# Patient Record
Sex: MALE | Race: WHITE | NOT HISPANIC OR LATINO | Employment: OTHER | URBAN - METROPOLITAN AREA
[De-identification: names, ages, dates, MRNs, and addresses within clinical notes are randomized per-mention and may not be internally consistent; named-entity substitution may affect disease eponyms.]

---

## 2023-06-06 ENCOUNTER — OFFICE VISIT (OUTPATIENT)
Dept: PRIMARY CARE | Facility: CLINIC | Age: 53
End: 2023-06-06

## 2023-06-06 ENCOUNTER — TELEPHONE (OUTPATIENT)
Dept: PRIMARY CARE | Facility: CLINIC | Age: 53
End: 2023-06-06

## 2023-06-06 VITALS
OXYGEN SATURATION: 94 % | DIASTOLIC BLOOD PRESSURE: 62 MMHG | HEART RATE: 67 BPM | WEIGHT: 199 LBS | TEMPERATURE: 98 F | SYSTOLIC BLOOD PRESSURE: 112 MMHG

## 2023-06-06 DIAGNOSIS — M1A.9XX0 CHRONIC GOUT, UNSPECIFIED CAUSE, UNSPECIFIED SITE: Primary | ICD-10-CM

## 2023-06-06 DIAGNOSIS — M1A.9XX0 CHRONIC GOUT, UNSPECIFIED CAUSE, UNSPECIFIED SITE: ICD-10-CM

## 2023-06-06 PROBLEM — M10.9 GOUT: Status: ACTIVE | Noted: 2021-10-29

## 2023-06-06 PROBLEM — E78.5 HYPERLIPIDEMIA: Status: ACTIVE | Noted: 2022-08-18

## 2023-06-06 PROBLEM — J45.990 EXERCISE-INDUCED ASTHMA (HHS-HCC): Status: ACTIVE | Noted: 2021-10-29

## 2023-06-06 PROCEDURE — 99213 OFFICE O/P EST LOW 20 MIN: CPT | Performed by: FAMILY MEDICINE

## 2023-06-06 PROCEDURE — 1036F TOBACCO NON-USER: CPT | Performed by: FAMILY MEDICINE

## 2023-06-06 RX ORDER — PREDNISONE 20 MG/1
40 TABLET ORAL DAILY
Qty: 14 TABLET | Refills: 0 | Status: SHIPPED | OUTPATIENT
Start: 2023-06-06 | End: 2023-06-06 | Stop reason: SDUPTHER

## 2023-06-06 RX ORDER — ALLOPURINOL 300 MG/1
300 TABLET ORAL DAILY
COMMUNITY
End: 2023-06-06 | Stop reason: SDUPTHER

## 2023-06-06 RX ORDER — COLCHICINE 0.6 MG/1
0.6 TABLET ORAL DAILY
Qty: 30 TABLET | Refills: 5 | Status: SHIPPED | OUTPATIENT
Start: 2023-06-06 | End: 2023-11-14 | Stop reason: SDUPTHER

## 2023-06-06 RX ORDER — PREDNISONE 20 MG/1
40 TABLET ORAL DAILY
Qty: 14 TABLET | Refills: 0 | Status: SHIPPED | OUTPATIENT
Start: 2023-06-06 | End: 2023-06-13

## 2023-06-06 RX ORDER — ALBUTEROL SULFATE 90 UG/1
2 AEROSOL, METERED RESPIRATORY (INHALATION) EVERY 4 HOURS PRN
COMMUNITY
Start: 2021-10-29

## 2023-06-06 RX ORDER — ALLOPURINOL 300 MG/1
300 TABLET ORAL DAILY
Qty: 90 TABLET | Refills: 1 | Status: SHIPPED | OUTPATIENT
Start: 2023-06-06 | End: 2023-11-14 | Stop reason: SDUPTHER

## 2023-06-06 RX ORDER — COLCHICINE 0.6 MG/1
0.6 TABLET ORAL
COMMUNITY
Start: 2022-08-18

## 2023-06-06 RX ORDER — COLCHICINE 0.6 MG/1
0.6 TABLET ORAL DAILY
Qty: 30 TABLET | Refills: 5 | Status: SHIPPED | OUTPATIENT
Start: 2023-06-06 | End: 2023-06-06 | Stop reason: SDUPTHER

## 2023-06-06 NOTE — PROGRESS NOTES
Subjective   Patient ID: Brigido Hill is a 52 y.o. male who presents for Gout (Recheck- pt states having gout pain daily x2 months. ).  HPI patient has been having pain in his right foot for 2 months now.  He has been taking his allopurinol.  No longer taking the colchicine because he ran out.  He does not drink very much alcohol maybe 2 drinks a week.  Has been eating some cheese but not a lot.    Current Outpatient Medications on File Prior to Visit   Medication Sig Dispense Refill    allopurinol (Zyloprim) 300 mg tablet Take 1 tablet (300 mg) by mouth once daily.      albuterol 90 mcg/actuation inhaler Inhale 2 puffs every 4 hours if needed.      colchicine 0.6 mg tablet Take 1 tablet (0.6 mg) by mouth.       No current facility-administered medications on file prior to visit.        Vitals:    06/06/23 1034   BP: 112/62   Pulse: 67   Temp: 36.7 °C (98 °F)   SpO2: 94%       Review of Systems   All other systems reviewed and are negative.      Objective     Physical Exam  Constitutional:       Appearance: Normal appearance. He is well-developed.   HENT:      Head: Atraumatic.   Cardiovascular:      Heart sounds: No murmur heard.  Abdominal:      General: Bowel sounds are normal.      Palpations: Abdomen is soft.   Skin:     General: Skin is warm.   Neurological:      General: No focal deficit present.      Mental Status: He is alert.   Psychiatric:         Mood and Affect: Mood normal.         No visits with results within 2 Month(s) from this visit.   Latest known visit with results is:   Legacy Encounter on 08/11/2022   Component Date Value Ref Range Status    Glucose 08/11/2022 91  74 - 99 mg/dL Final    Sodium 08/11/2022 141  136 - 145 mmol/L Final    Potassium 08/11/2022 4.2  3.5 - 5.3 mmol/L Final    Chloride 08/11/2022 105  98 - 107 mmol/L Final    Bicarbonate 08/11/2022 25  21 - 32 mmol/L Final    Anion Gap 08/11/2022 15  10 - 20 mmol/L Final    Urea Nitrogen 08/11/2022 13  6 - 23 mg/dL Final     Creatinine 08/11/2022 1.02  0.50 - 1.30 mg/dL Final    GFR MALE 08/11/2022 89  >90 mL/min/1.73m2 Final    Comment:  CALCULATIONS OF ESTIMATED GFR ARE PERFORMED   USING THE 2021 CKD-EPI STUDY REFIT EQUATION   WITHOUT THE RACE VARIABLE FOR THE IDMS-TRACEABLE   CREATININE METHODS.    https://jasn.asnjournals.org/content/early/2021/09/22/ASN.8392339940      Calcium 08/11/2022 9.3  8.6 - 10.6 mg/dL Final    Albumin 08/11/2022 4.2  3.4 - 5.0 g/dL Final    Alkaline Phosphatase 08/11/2022 51  33 - 120 U/L Final    Total Protein 08/11/2022 6.6  6.4 - 8.2 g/dL Final    AST 08/11/2022 16  9 - 39 U/L Final    Total Bilirubin 08/11/2022 1.8 (H)  0.0 - 1.2 mg/dL Final    ALT (SGPT) 08/11/2022 21  10 - 52 U/L Final    Comment:  Patients treated with Sulfasalazine may generate    falsely decreased results for ALT.      Uric Acid 08/11/2022 8.7 (H)  4.0 - 7.5 mg/dL Final    Comment:  Venipuncture immediately after or during the    administration of Metamizole may lead to falsely   low results. Testing should be performed immediately   prior to Metamizole dosing.      WBC 08/11/2022 5.2  4.4 - 11.3 x10E9/L Final    nRBC 08/11/2022 0.0  0.0 - 0.0 /100 WBC Final    RBC 08/11/2022 4.96  4.50 - 5.90 x10E12/L Final    Hemoglobin 08/11/2022 15.7  13.5 - 17.5 g/dL Final    Hematocrit 08/11/2022 48.8  41.0 - 52.0 % Final    MCV 08/11/2022 98  80 - 100 fL Final    MCHC 08/11/2022 32.2  32.0 - 36.0 g/dL Final    Platelets 08/11/2022 258  150 - 450 x10E9/L Final    RDW 08/11/2022 13.0  11.5 - 14.5 % Final    Neutrophils % 08/11/2022 58.7  40.0 - 80.0 % Final    Immature Granulocytes %, Automated 08/11/2022 0.2  0.0 - 0.9 % Final    Comment:  Immature Granulocyte Count (IG) includes promyelocytes,    myelocytes and metamyelocytes but does not include bands.   Percent differential counts (%) should be interpreted in the   context of the absolute cell counts (cells/L).      Lymphocytes % 08/11/2022 29.8  13.0 - 44.0 % Final    Monocytes %  08/11/2022 7.2  2.0 - 10.0 % Final    Eosinophils % 08/11/2022 3.5  0.0 - 6.0 % Final    Basophils % 08/11/2022 0.6  0.0 - 2.0 % Final    Neutrophils Absolute 08/11/2022 3.03  1.20 - 7.70 x10E9/L Final    Lymphocytes Absolute 08/11/2022 1.54  1.20 - 4.80 x10E9/L Final    Monocytes Absolute 08/11/2022 0.37  0.10 - 1.00 x10E9/L Final    Eosinophils Absolute 08/11/2022 0.18  0.00 - 0.70 x10E9/L Final    Basophils Absolute 08/11/2022 0.03  0.00 - 0.10 x10E9/L Final    Cholesterol 08/11/2022 257 (H)  0 - 199 mg/dL Final    Comment: .      AGE      DESIRABLE   BORDERLINE HIGH   HIGH     0-19 Y     0 - 169       170 - 199     >/= 200    20-24 Y     0 - 189       190 - 224     >/= 225         >24 Y     0 - 199       200 - 239     >/= 240   **All ranges are based on fasting samples. Specific   therapeutic targets will vary based on patient-specific   cardiac risk.  .   Pediatric guidelines reference:Pediatrics 2011, 128(S5).   Adult guidelines reference: NCEP ATPIII Guidelines,     WEI 2001, 258:2486-97  .   Venipuncture immediately after or during the    administration of Metamizole may lead to falsely   low results. Testing should be performed immediately   prior to Metamizole dosing.      HDL 08/11/2022 37.2 (A)  mg/dL Final    Comment: .      AGE      VERY LOW   LOW     NORMAL    HIGH       0-19 Y       < 35   < 40     40-45     ----    20-24 Y       ----   < 40       >45     ----      >24 Y       ----   < 40     40-60      >60  .      Cholesterol/HDL Ratio 08/11/2022 6.9 (A)   Final    Comment: REF VALUES  DESIRABLE  < 3.4  HIGH RISK  > 5.0      LDL 08/11/2022 171 (H)  0 - 99 mg/dL Final    Comment: .                           NEAR      BORD      AGE      DESIRABLE  OPTIMAL    HIGH     HIGH     VERY HIGH     0-19 Y     0 - 109     ---    110-129   >/= 130     ----    20-24 Y     0 - 119     ---    120-159   >/= 160     ----      >24 Y     0 -  99   100-129  130-159   160-189     >/=190  .      [facility] 08/11/2022 49 (H)  0  - 40 mg/dL Final    Triglycerides 08/11/2022 245 (H)  0 - 149 mg/dL Final    Comment: .      AGE      DESIRABLE   BORDERLINE HIGH   HIGH     VERY HIGH   0 D-90 D    19 - 174         ----         ----        ----  91 D- 9 Y     0 -  74        75 -  99     >/= 100      ----    10-19 Y     0 -  89        90 - 129     >/= 130      ----    20-24 Y     0 - 114       115 - 149     >/= 150      ----         >24 Y     0 - 149       150 - 199    200- 499    >/= 500  .   Venipuncture immediately after or during the    administration of Metamizole may lead to falsely   low results. Testing should be performed immediately   prior to Metamizole dosing.      Non HDL Cholesterol 08/11/2022 220  mg/dL Final    Comment:     AGE      DESIRABLE   BORDERLINE HIGH   HIGH     VERY HIGH     0-19 Y     0 - 119       120 - 144     >/= 145    >/= 160    20-24 Y     0 - 149       150 - 189     >/= 190      ----         >24 Y    30 MG/DL ABOVE LDL CHOLESTEROL GOAL  .      PSA 08/11/2022 0.71  0.00 - 4.00 ng/mL Final    Comment: The FDA requires that the method used for PSA assay be   reported to the physician. Values obtained with different   assay methods must not be used interchangeably. This test   was performed at [facility] using the Siemens  GlycoVaxyn PSA method, which is a sandwich immunoassay using   chemiluminescence for quantitation. The assay is approved  for measurement of prostate-specific antigen (PSA) in   serum and may be used in conjunction with a digital rectal  examination in men 50 years and older as an aid in   detection of prostate cancer.   5-Alpha-reductase inhibitors (e.g. Proscar, Finasteride,   Avodart, Dutasteride and Shae) for the treatment of BPH   have been shown to lower PSA levels by an average of 50%   after 6 months of treatment.         Assessment/Plan   Problem List Items Addressed This Visit    None  Visit Diagnoses       Chronic gout, unspecified cause, unspecified site    -  Primary     Relevant Medications    predniSONE (Deltasone) 20 mg tablet    colchicine 0.6 mg tablet    Other Relevant Orders    Uric acid    Comprehensive Metabolic Panel        Giving prednisone burst and checking uric acid levels.  Will give daily colchicine as needed.  And may adjust dosage of allopurinol if needed

## 2023-06-16 ENCOUNTER — LAB (OUTPATIENT)
Dept: LAB | Facility: LAB | Age: 53
End: 2023-06-16

## 2023-06-16 ENCOUNTER — TELEPHONE (OUTPATIENT)
Dept: PRIMARY CARE | Facility: CLINIC | Age: 53
End: 2023-06-16

## 2023-06-16 DIAGNOSIS — M1A.9XX0 CHRONIC GOUT, UNSPECIFIED CAUSE, UNSPECIFIED SITE: ICD-10-CM

## 2023-06-16 LAB
ALANINE AMINOTRANSFERASE (SGPT) (U/L) IN SER/PLAS: 35 U/L (ref 10–52)
ALBUMIN (G/DL) IN SER/PLAS: 4.2 G/DL (ref 3.4–5)
ALKALINE PHOSPHATASE (U/L) IN SER/PLAS: 58 U/L (ref 33–120)
ANION GAP IN SER/PLAS: 13 MMOL/L (ref 10–20)
ASPARTATE AMINOTRANSFERASE (SGOT) (U/L) IN SER/PLAS: 23 U/L (ref 9–39)
BILIRUBIN TOTAL (MG/DL) IN SER/PLAS: 1.3 MG/DL (ref 0–1.2)
CALCIUM (MG/DL) IN SER/PLAS: 9.8 MG/DL (ref 8.6–10.6)
CARBON DIOXIDE, TOTAL (MMOL/L) IN SER/PLAS: 31 MMOL/L (ref 21–32)
CHLORIDE (MMOL/L) IN SER/PLAS: 103 MMOL/L (ref 98–107)
CREATININE (MG/DL) IN SER/PLAS: 0.93 MG/DL (ref 0.5–1.3)
GFR MALE: >90 ML/MIN/1.73M2
GLUCOSE (MG/DL) IN SER/PLAS: 90 MG/DL (ref 74–99)
POTASSIUM (MMOL/L) IN SER/PLAS: 4.8 MMOL/L (ref 3.5–5.3)
PROTEIN TOTAL: 6.8 G/DL (ref 6.4–8.2)
SODIUM (MMOL/L) IN SER/PLAS: 142 MMOL/L (ref 136–145)
URATE (MG/DL) IN SER/PLAS: 5.7 MG/DL (ref 4–7.5)
UREA NITROGEN (MG/DL) IN SER/PLAS: 20 MG/DL (ref 6–23)

## 2023-06-16 PROCEDURE — 36415 COLL VENOUS BLD VENIPUNCTURE: CPT

## 2023-06-16 PROCEDURE — 84550 ASSAY OF BLOOD/URIC ACID: CPT

## 2023-06-16 PROCEDURE — 80053 COMPREHEN METABOLIC PANEL: CPT

## 2023-06-16 NOTE — RESULT ENCOUNTER NOTE
Patient's uric acid levels are excellent.  Are his feet still hurting with colchicine?  It may be that his foot pain is not necessarily from gout.

## 2023-06-16 NOTE — TELEPHONE ENCOUNTER
----- Message from Danial Fraser MD sent at 6/16/2023  2:36 PM EDT -----  Patient's uric acid levels are excellent.  Are his feet still hurting with colchicine?  It may be that his foot pain is not necessarily from gout.

## 2023-06-16 NOTE — TELEPHONE ENCOUNTER
I informed pt of res, he did say that his feet are doing better he is going to continue with the Gilmer/elbert

## 2023-11-14 ENCOUNTER — OFFICE VISIT (OUTPATIENT)
Dept: PRIMARY CARE | Facility: CLINIC | Age: 53
End: 2023-11-14

## 2023-11-14 VITALS
DIASTOLIC BLOOD PRESSURE: 64 MMHG | HEART RATE: 64 BPM | SYSTOLIC BLOOD PRESSURE: 116 MMHG | WEIGHT: 204 LBS | OXYGEN SATURATION: 93 % | TEMPERATURE: 97.1 F

## 2023-11-14 DIAGNOSIS — M1A.9XX0 CHRONIC GOUT, UNSPECIFIED CAUSE, UNSPECIFIED SITE: ICD-10-CM

## 2023-11-14 DIAGNOSIS — Z12.11 ENCOUNTER FOR SCREENING FOR MALIGNANT NEOPLASM OF COLON: ICD-10-CM

## 2023-11-14 DIAGNOSIS — M79.674 TOE PAIN, RIGHT: Primary | ICD-10-CM

## 2023-11-14 DIAGNOSIS — F41.9 ANXIETY: ICD-10-CM

## 2023-11-14 DIAGNOSIS — F33.42 RECURRENT MAJOR DEPRESSIVE DISORDER, IN FULL REMISSION (CMS-HCC): ICD-10-CM

## 2023-11-14 PROCEDURE — 1036F TOBACCO NON-USER: CPT | Performed by: FAMILY MEDICINE

## 2023-11-14 PROCEDURE — 99213 OFFICE O/P EST LOW 20 MIN: CPT | Performed by: FAMILY MEDICINE

## 2023-11-14 RX ORDER — ALLOPURINOL 300 MG/1
300 TABLET ORAL DAILY
Qty: 90 TABLET | Refills: 3 | Status: SHIPPED | OUTPATIENT
Start: 2023-11-14 | End: 2023-11-15 | Stop reason: SDUPTHER

## 2023-11-14 RX ORDER — DULOXETIN HYDROCHLORIDE 30 MG/1
30 CAPSULE, DELAYED RELEASE ORAL DAILY
Qty: 90 CAPSULE | Refills: 1 | Status: SHIPPED | OUTPATIENT
Start: 2023-11-14 | End: 2023-12-19 | Stop reason: ALTCHOICE

## 2023-11-14 RX ORDER — DULOXETIN HYDROCHLORIDE 60 MG/1
60 CAPSULE, DELAYED RELEASE ORAL DAILY
Qty: 90 CAPSULE | Refills: 1 | Status: SHIPPED | OUTPATIENT
Start: 2023-11-14 | End: 2023-11-14 | Stop reason: ALTCHOICE

## 2023-11-14 RX ORDER — DULOXETIN HYDROCHLORIDE 30 MG/1
30 CAPSULE, DELAYED RELEASE ORAL 2 TIMES DAILY
Qty: 90 CAPSULE | Refills: 1 | Status: SHIPPED | OUTPATIENT
Start: 2023-11-14 | End: 2023-11-14 | Stop reason: SDUPTHER

## 2023-11-14 NOTE — PROGRESS NOTES
Subjective   Patient ID: Brigido Hill is a 53 y.o. male who presents for Gout (Recheck- R great toe always pain ).  HPI Gout: has still been having some pain in his R toe.  No flares, just pain especially after moving.    Depression/anxiety: Has been feeling depressed and anxious for a number of years.  Has been working with a counselor who recently encouraged him to talk to his doctor about getting treated.  Often times feels anxious about things more than they need to be.  Worries about things a lot.    Pain in head: Patient has been dealing with some TMJ discomfort in his left jaw.  He also says that for years now whenever he coughs or sneezes he has a sharp pain on his side of his left temple.  It only last couple seconds but it hurts a lot.  He oftentimes has to have a hand pressing on his left temple when he sneezes so it hurts less.  Denies any nausea vomiting or dizziness      Current Outpatient Medications on File Prior to Visit   Medication Sig Dispense Refill    albuterol 90 mcg/actuation inhaler Inhale 2 puffs every 4 hours if needed.      colchicine 0.6 mg tablet Take 1 tablet (0.6 mg) by mouth.      [DISCONTINUED] allopurinol (Zyloprim) 300 mg tablet Take 1 tablet (300 mg) by mouth once daily. 90 tablet 1    [DISCONTINUED] colchicine 0.6 mg tablet Take 1 tablet (0.6 mg) by mouth once daily. (Patient not taking: Reported on 11/14/2023) 30 tablet 5     No current facility-administered medications on file prior to visit.        Vitals:    11/14/23 1036   BP: 116/64   Pulse: 64   Temp: 36.2 °C (97.1 °F)   SpO2: 93%       Review of Systems   All other systems reviewed and are negative.      Objective     Physical Exam  Constitutional:       Appearance: Normal appearance. He is well-developed.   HENT:      Head: Atraumatic.   Cardiovascular:      Heart sounds: No murmur heard.  Abdominal:      General: Bowel sounds are normal.      Palpations: Abdomen is soft.   Skin:     General: Skin is warm.    Neurological:      General: No focal deficit present.      Mental Status: He is alert.   Psychiatric:         Mood and Affect: Mood normal.         No visits with results within 2 Month(s) from this visit.   Latest known visit with results is:   Lab on 06/16/2023   Component Date Value Ref Range Status    Uric Acid 06/16/2023 5.7  4.0 - 7.5 mg/dL Final     Venipuncture immediately after or during the    administration of Metamizole may lead to falsely   low results. Testing should be performed immediately   prior to Metamizole dosing.    Glucose 06/16/2023 90  74 - 99 mg/dL Final    Sodium 06/16/2023 142  136 - 145 mmol/L Final    Potassium 06/16/2023 4.8  3.5 - 5.3 mmol/L Final    Chloride 06/16/2023 103  98 - 107 mmol/L Final    Bicarbonate 06/16/2023 31  21 - 32 mmol/L Final    Anion Gap 06/16/2023 13  10 - 20 mmol/L Final    Urea Nitrogen 06/16/2023 20  6 - 23 mg/dL Final    Creatinine 06/16/2023 0.93  0.50 - 1.30 mg/dL Final    GFR MALE 06/16/2023 >90  >90 mL/min/1.73m2 Final     CALCULATIONS OF ESTIMATED GFR ARE PERFORMED   USING THE 2021 CKD-EPI STUDY REFIT EQUATION   WITHOUT THE RACE VARIABLE FOR THE IDMS-TRACEABLE   CREATININE METHODS.    https://jasn.asnjournals.org/content/early/2021/09/22/ASN.4668105105    Calcium 06/16/2023 9.8  8.6 - 10.6 mg/dL Final    Albumin 06/16/2023 4.2  3.4 - 5.0 g/dL Final    Alkaline Phosphatase 06/16/2023 58  33 - 120 U/L Final    Total Protein 06/16/2023 6.8  6.4 - 8.2 g/dL Final    AST 06/16/2023 23  9 - 39 U/L Final    Total Bilirubin 06/16/2023 1.3 (H)  0.0 - 1.2 mg/dL Final    ALT (SGPT) 06/16/2023 35  10 - 52 U/L Final     Patients treated with Sulfasalazine may generate    falsely decreased results for ALT.       Assessment/Plan   Problem List Items Addressed This Visit    None  Visit Diagnoses       Toe pain, right    -  Primary    Relevant Orders    XR toe right 2+ views    Chronic gout, unspecified cause, unspecified site        Relevant Medications    allopurinol  (Zyloprim) 300 mg tablet    Recurrent major depressive disorder, in full remission (CMS/HCC)        Relevant Medications    DULoxetine (Cymbalta) 30 mg DR capsule    Anxiety        Encounter for screening for malignant neoplasm of colon        Relevant Orders    Colonoscopy Screening; Average Risk Patient          Started patient on Cymbalta.  We will increase his dosage if not controlled in 2 months.  Try high-dose ibuprofen for 1 week for his foot.  If not getting better we will get an x-ray of his foot and refer him to podiatry.  Follow-up in 6 months

## 2023-11-15 DIAGNOSIS — M1A.9XX0 CHRONIC GOUT, UNSPECIFIED CAUSE, UNSPECIFIED SITE: ICD-10-CM

## 2023-11-16 RX ORDER — ALLOPURINOL 300 MG/1
300 TABLET ORAL DAILY
Qty: 90 TABLET | Refills: 3 | Status: SHIPPED | OUTPATIENT
Start: 2023-11-16 | End: 2023-11-22 | Stop reason: SDUPTHER

## 2023-11-18 DIAGNOSIS — M1A.9XX0 CHRONIC GOUT, UNSPECIFIED CAUSE, UNSPECIFIED SITE: ICD-10-CM

## 2023-11-20 RX ORDER — ALLOPURINOL 300 MG/1
300 TABLET ORAL DAILY
Qty: 90 TABLET | Refills: 0 | OUTPATIENT
Start: 2023-11-20

## 2023-11-22 DIAGNOSIS — M1A.9XX0 CHRONIC GOUT, UNSPECIFIED CAUSE, UNSPECIFIED SITE: ICD-10-CM

## 2023-11-22 RX ORDER — ALLOPURINOL 300 MG/1
300 TABLET ORAL DAILY
Qty: 90 TABLET | Refills: 0 | Status: SHIPPED | OUTPATIENT
Start: 2023-11-22 | End: 2024-02-15 | Stop reason: SDUPTHER

## 2023-11-22 NOTE — TELEPHONE ENCOUNTER
Pt called rx line stating he has called x3 times and the pharmacy has called x1 to refill his Allopurinol. Pt stated RX was supposed to be sent to [facility] in [place]. RX was sent to [facility]. Called pt, AMARILIS to see if pt would like us to resent at [facility] or if he would like to  at [facility]. Thanks, CG

## 2023-11-22 NOTE — TELEPHONE ENCOUNTER
Pt states he cannot use [facility]. Pt has to use [facility].  Pt is requesting this med be resent.  Pt has been out x1 week. OK to be resent for pt? Thanks, CG

## 2023-12-19 DIAGNOSIS — F33.42 RECURRENT MAJOR DEPRESSIVE DISORDER, IN FULL REMISSION (CMS-HCC): ICD-10-CM

## 2023-12-19 RX ORDER — DULOXETIN HYDROCHLORIDE 30 MG/1
30 CAPSULE, DELAYED RELEASE ORAL DAILY
Qty: 90 CAPSULE | Refills: 1 | Status: CANCELLED | OUTPATIENT
Start: 2023-12-19 | End: 2024-06-16

## 2023-12-19 RX ORDER — DULOXETIN HYDROCHLORIDE 60 MG/1
60 CAPSULE, DELAYED RELEASE ORAL DAILY
Qty: 90 CAPSULE | Refills: 1 | Status: SHIPPED | OUTPATIENT
Start: 2023-12-19 | End: 2024-05-14 | Stop reason: SDUPTHER

## 2023-12-19 NOTE — TELEPHONE ENCOUNTER
Pt called Rx line stating that as of Dec 6th pended medication is not working for him, he is asking for a refill on med with an increase in dosage? Please advise, AM    Alis Brennan

## 2024-02-15 DIAGNOSIS — M1A.9XX0 CHRONIC GOUT, UNSPECIFIED CAUSE, UNSPECIFIED SITE: ICD-10-CM

## 2024-02-15 RX ORDER — ALLOPURINOL 300 MG/1
300 TABLET ORAL DAILY
Qty: 90 TABLET | Refills: 0 | Status: SHIPPED | OUTPATIENT
Start: 2024-02-15 | End: 2024-05-14 | Stop reason: SDUPTHER

## 2024-05-14 ENCOUNTER — OFFICE VISIT (OUTPATIENT)
Dept: PRIMARY CARE | Facility: CLINIC | Age: 54
End: 2024-05-14

## 2024-05-14 VITALS
OXYGEN SATURATION: 96 % | TEMPERATURE: 97.4 F | DIASTOLIC BLOOD PRESSURE: 72 MMHG | WEIGHT: 205 LBS | SYSTOLIC BLOOD PRESSURE: 112 MMHG | HEART RATE: 70 BPM

## 2024-05-14 DIAGNOSIS — M1A.9XX0 CHRONIC GOUT, UNSPECIFIED CAUSE, UNSPECIFIED SITE: ICD-10-CM

## 2024-05-14 DIAGNOSIS — R14.0 BLOATING: ICD-10-CM

## 2024-05-14 DIAGNOSIS — Z12.11 SCREENING FOR MALIGNANT NEOPLASM OF COLON: ICD-10-CM

## 2024-05-14 DIAGNOSIS — F33.42 RECURRENT MAJOR DEPRESSIVE DISORDER, IN FULL REMISSION (CMS-HCC): ICD-10-CM

## 2024-05-14 DIAGNOSIS — J45.990 EXERCISE-INDUCED ASTHMA (HHS-HCC): Primary | ICD-10-CM

## 2024-05-14 PROCEDURE — 1036F TOBACCO NON-USER: CPT | Performed by: FAMILY MEDICINE

## 2024-05-14 PROCEDURE — 99213 OFFICE O/P EST LOW 20 MIN: CPT | Performed by: FAMILY MEDICINE

## 2024-05-14 RX ORDER — DULOXETIN HYDROCHLORIDE 60 MG/1
60 CAPSULE, DELAYED RELEASE ORAL DAILY
Qty: 90 CAPSULE | Refills: 3 | Status: SHIPPED | OUTPATIENT
Start: 2024-05-14 | End: 2025-05-09

## 2024-05-14 RX ORDER — ALLOPURINOL 300 MG/1
300 TABLET ORAL DAILY
Qty: 90 TABLET | Refills: 3 | Status: SHIPPED | OUTPATIENT
Start: 2024-05-14

## 2024-05-14 RX ORDER — ALLOPURINOL 300 MG/1
300 TABLET ORAL DAILY
Qty: 90 TABLET | Refills: 1 | Status: SHIPPED | OUTPATIENT
Start: 2024-05-14 | End: 2024-05-14

## 2024-05-14 RX ORDER — DULOXETIN HYDROCHLORIDE 60 MG/1
60 CAPSULE, DELAYED RELEASE ORAL DAILY
Qty: 90 CAPSULE | Refills: 1 | Status: SHIPPED | OUTPATIENT
Start: 2024-05-14 | End: 2024-05-14

## 2024-05-14 NOTE — PROGRESS NOTES
Subjective   Patient ID: Brigido Hill is a 53 y.o. male who presents for Hyperlipidemia (recheck).  HPI Gout: Pain has been better, still stiff but not painful    Depression/anxiety: feeling much better on the Cymbalta.  Finds that he doesn't ruminate on things as much.    Pain in head: Patient has been dealing with some TMJ discomfort in his left jaw.  He also says that for years now whenever he coughs or sneezes he has a sharp pain on his side of his left temple.  Worse than it was before.        Current Outpatient Medications on File Prior to Visit   Medication Sig Dispense Refill    albuterol 90 mcg/actuation inhaler Inhale 2 puffs every 4 hours if needed.      allopurinol (Zyloprim) 300 mg tablet Take 1 tablet (300 mg) by mouth once daily. 90 tablet 0    colchicine 0.6 mg tablet Take 1 tablet (0.6 mg) by mouth.      DULoxetine (Cymbalta) 60 mg DR capsule Take 1 capsule (60 mg) by mouth once daily. Do not crush or chew. 90 capsule 1     No current facility-administered medications on file prior to visit.        Vitals:    05/14/24 1236   BP: 112/72   Pulse: 70   Temp: 36.3 °C (97.4 °F)   SpO2: 96%       Review of Systems   All other systems reviewed and are negative.      Objective     Physical Exam  Constitutional:       Appearance: Normal appearance. He is well-developed.   HENT:      Head: Atraumatic.   Cardiovascular:      Heart sounds: No murmur heard.  Abdominal:      General: Bowel sounds are normal.      Palpations: Abdomen is soft.   Skin:     General: Skin is warm.   Neurological:      General: No focal deficit present.      Mental Status: He is alert.   Psychiatric:         Mood and Affect: Mood normal.         No visits with results within 2 Month(s) from this visit.   Latest known visit with results is:   Lab on 06/16/2023   Component Date Value Ref Range Status    Uric Acid 06/16/2023 5.7  4.0 - 7.5 mg/dL Final     Venipuncture immediately after or during the    administration of Metamizole may  lead to falsely   low results. Testing should be performed immediately   prior to Metamizole dosing.    Glucose 06/16/2023 90  74 - 99 mg/dL Final    Sodium 06/16/2023 142  136 - 145 mmol/L Final    Potassium 06/16/2023 4.8  3.5 - 5.3 mmol/L Final    Chloride 06/16/2023 103  98 - 107 mmol/L Final    Bicarbonate 06/16/2023 31  21 - 32 mmol/L Final    Anion Gap 06/16/2023 13  10 - 20 mmol/L Final    Urea Nitrogen 06/16/2023 20  6 - 23 mg/dL Final    Creatinine 06/16/2023 0.93  0.50 - 1.30 mg/dL Final    GFR MALE 06/16/2023 >90  >90 mL/min/1.73m2 Final     CALCULATIONS OF ESTIMATED GFR ARE PERFORMED   USING THE 2021 CKD-EPI STUDY REFIT EQUATION   WITHOUT THE RACE VARIABLE FOR THE IDMS-TRACEABLE   CREATININE METHODS.    https://jasn.asnjournals.org/content/early/2021/09/22/ASN.9853981866    Calcium 06/16/2023 9.8  8.6 - 10.6 mg/dL Final    Albumin 06/16/2023 4.2  3.4 - 5.0 g/dL Final    Alkaline Phosphatase 06/16/2023 58  33 - 120 U/L Final    Total Protein 06/16/2023 6.8  6.4 - 8.2 g/dL Final    AST 06/16/2023 23  9 - 39 U/L Final    Total Bilirubin 06/16/2023 1.3 (H)  0.0 - 1.2 mg/dL Final    ALT (SGPT) 06/16/2023 35  10 - 52 U/L Final     Patients treated with Sulfasalazine may generate    falsely decreased results for ALT.       Assessment/Plan   Problem List Items Addressed This Visit       Exercise-induced asthma (HHS-HCC) - Primary     Other Visit Diagnoses       Chronic gout, unspecified cause, unspecified site        Recurrent major depressive disorder, in full remission (CMS-HCC)              Doing well.  Refilled meds.   Work on weight loss.   Fu in 12 mo

## 2024-06-12 DIAGNOSIS — M1A.9XX0 CHRONIC GOUT, UNSPECIFIED CAUSE, UNSPECIFIED SITE: ICD-10-CM

## 2024-06-12 DIAGNOSIS — F33.42 RECURRENT MAJOR DEPRESSIVE DISORDER, IN FULL REMISSION (CMS-HCC): ICD-10-CM

## 2024-06-12 RX ORDER — ALLOPURINOL 300 MG/1
300 TABLET ORAL DAILY
Qty: 90 TABLET | Refills: 3 | Status: SHIPPED | OUTPATIENT
Start: 2024-06-12

## 2024-06-12 RX ORDER — DULOXETIN HYDROCHLORIDE 60 MG/1
60 CAPSULE, DELAYED RELEASE ORAL DAILY
Qty: 90 CAPSULE | Refills: 3 | Status: SHIPPED | OUTPATIENT
Start: 2024-06-12 | End: 2025-06-07

## 2024-06-12 NOTE — TELEPHONE ENCOUNTER
Pt will be out of is rx, wife requesting refills be sent to [facility] because printed scripts have the wrong dates on them and pharmacy will not fill. Wife asking for both scripts be sent to [facility].      Allopurinol  Duloxetine

## 2024-06-13 NOTE — TELEPHONE ENCOUNTER
Wife called checking status of Rx refill.  Confirmed pharmacy and pt uses [facility] in [place]

## 2025-05-15 ENCOUNTER — APPOINTMENT (OUTPATIENT)
Dept: PRIMARY CARE | Facility: CLINIC | Age: 55
End: 2025-05-15

## 2025-05-15 VITALS
BODY MASS INDEX: 30.16 KG/M2 | DIASTOLIC BLOOD PRESSURE: 66 MMHG | HEIGHT: 68 IN | SYSTOLIC BLOOD PRESSURE: 98 MMHG | WEIGHT: 199 LBS | HEART RATE: 80 BPM | OXYGEN SATURATION: 93 % | TEMPERATURE: 97.7 F

## 2025-05-15 DIAGNOSIS — F33.42 RECURRENT MAJOR DEPRESSIVE DISORDER, IN FULL REMISSION: Primary | ICD-10-CM

## 2025-05-15 DIAGNOSIS — M1A.9XX0 CHRONIC GOUT, UNSPECIFIED CAUSE, UNSPECIFIED SITE: ICD-10-CM

## 2025-05-15 PROCEDURE — 3008F BODY MASS INDEX DOCD: CPT | Performed by: FAMILY MEDICINE

## 2025-05-15 PROCEDURE — 99213 OFFICE O/P EST LOW 20 MIN: CPT | Performed by: FAMILY MEDICINE

## 2025-05-15 PROCEDURE — 1036F TOBACCO NON-USER: CPT | Performed by: FAMILY MEDICINE

## 2025-05-15 RX ORDER — ALLOPURINOL 300 MG/1
300 TABLET ORAL DAILY
Qty: 90 TABLET | Refills: 3 | Status: SHIPPED | OUTPATIENT
Start: 2025-05-15 | End: 2025-05-15

## 2025-05-15 RX ORDER — DULOXETIN HYDROCHLORIDE 60 MG/1
60 CAPSULE, DELAYED RELEASE ORAL DAILY
Qty: 90 CAPSULE | Refills: 3 | Status: SHIPPED | OUTPATIENT
Start: 2025-05-15 | End: 2026-05-10

## 2025-05-15 RX ORDER — ALLOPURINOL 300 MG/1
300 TABLET ORAL DAILY
Qty: 90 TABLET | Refills: 3 | Status: SHIPPED | OUTPATIENT
Start: 2025-05-15

## 2025-05-15 ASSESSMENT — PATIENT HEALTH QUESTIONNAIRE - PHQ9
SUM OF ALL RESPONSES TO PHQ9 QUESTIONS 1 AND 2: 0
2. FEELING DOWN, DEPRESSED OR HOPELESS: NOT AT ALL
1. LITTLE INTEREST OR PLEASURE IN DOING THINGS: NOT AT ALL

## 2025-05-15 NOTE — PROGRESS NOTES
Subjective   Patient ID: Brigido Hill is a 54 y.o. male who presents for Annual Exam.  HPI     Gout: Pain has been better, no more flares on allopurinol    Depression/anxiety: feeling much better on the Cymbalta.  Finds that he doesn't ruminate on things as much.        Current Outpatient Medications on File Prior to Visit   Medication Sig Dispense Refill   • allopurinol (Zyloprim) 300 mg tablet Take 1 tablet (300 mg) by mouth once daily. 90 tablet 3   • DULoxetine (Cymbalta) 60 mg DR capsule Take 1 capsule (60 mg) by mouth once daily. Do not crush or chew. 90 capsule 3   • [DISCONTINUED] albuterol 90 mcg/actuation inhaler Inhale 2 puffs every 4 hours if needed.     • [DISCONTINUED] colchicine 0.6 mg tablet Take 1 tablet (0.6 mg) by mouth.       No current facility-administered medications on file prior to visit.        Vitals:    05/15/25 1201   BP: 98/66   Pulse: 80   Temp: 36.5 °C (97.7 °F)   SpO2: 93%       Review of Systems   All other systems reviewed and are negative.      Objective     Physical Exam  Constitutional:       Appearance: Normal appearance. He is well-developed.   HENT:      Head: Atraumatic.   Cardiovascular:      Heart sounds: No murmur heard.  Abdominal:      General: Bowel sounds are normal.      Palpations: Abdomen is soft.   Skin:     General: Skin is warm.   Neurological:      General: No focal deficit present.      Mental Status: He is alert.   Psychiatric:         Mood and Affect: Mood normal.       No visits with results within 2 Month(s) from this visit.   Latest known visit with results is:   Lab on 06/16/2023   Component Date Value Ref Range Status   • Uric Acid 06/16/2023 5.7  4.0 - 7.5 mg/dL Final     Venipuncture immediately after or during the    administration of Metamizole may lead to falsely   low results. Testing should be performed immediately   prior to Metamizole dosing.   • Glucose 06/16/2023 90  74 - 99 mg/dL Final   • Sodium 06/16/2023 142  136 - 145 mmol/L Final   •  Potassium 06/16/2023 4.8  3.5 - 5.3 mmol/L Final   • Chloride 06/16/2023 103  98 - 107 mmol/L Final   • Bicarbonate 06/16/2023 31  21 - 32 mmol/L Final   • Anion Gap 06/16/2023 13  10 - 20 mmol/L Final   • Urea Nitrogen 06/16/2023 20  6 - 23 mg/dL Final   • Creatinine 06/16/2023 0.93  0.50 - 1.30 mg/dL Final   • GFR MALE 06/16/2023 >90  >90 mL/min/1.73m2 Final     CALCULATIONS OF ESTIMATED GFR ARE PERFORMED   USING THE 2021 CKD-EPI STUDY REFIT EQUATION   WITHOUT THE RACE VARIABLE FOR THE IDMS-TRACEABLE   CREATININE METHODS.    https://jasn.asnjournals.org/content/early/2021/09/22/ASN.3068944832   • Calcium 06/16/2023 9.8  8.6 - 10.6 mg/dL Final   • Albumin 06/16/2023 4.2  3.4 - 5.0 g/dL Final   • Alkaline Phosphatase 06/16/2023 58  33 - 120 U/L Final   • Total Protein 06/16/2023 6.8  6.4 - 8.2 g/dL Final   • AST 06/16/2023 23  9 - 39 U/L Final   • Total Bilirubin 06/16/2023 1.3 (H)  0.0 - 1.2 mg/dL Final   • ALT (SGPT) 06/16/2023 35  10 - 52 U/L Final     Patients treated with Sulfasalazine may generate    falsely decreased results for ALT.       Assessment/Plan   Problem List Items Addressed This Visit       Hyperlipidemia - Primary     Other Visit Diagnoses         Recurrent major depressive disorder, in full remission          Chronic gout, unspecified cause, unspecified site              Doing well.  Refilled meds.   Work on weight loss.   Fu in 12 mo

## 2025-07-30 DIAGNOSIS — M1A.9XX0 CHRONIC GOUT, UNSPECIFIED CAUSE, UNSPECIFIED SITE: ICD-10-CM

## 2025-07-30 DIAGNOSIS — F33.42 RECURRENT MAJOR DEPRESSIVE DISORDER, IN FULL REMISSION: ICD-10-CM

## 2026-05-18 ENCOUNTER — APPOINTMENT (OUTPATIENT)
Dept: PRIMARY CARE | Facility: CLINIC | Age: 56
End: 2026-05-18